# Patient Record
Sex: MALE | Race: WHITE | NOT HISPANIC OR LATINO | Employment: PART TIME | URBAN - METROPOLITAN AREA
[De-identification: names, ages, dates, MRNs, and addresses within clinical notes are randomized per-mention and may not be internally consistent; named-entity substitution may affect disease eponyms.]

---

## 2023-05-19 ENCOUNTER — HOSPITAL ENCOUNTER (OUTPATIENT)
Facility: HOSPITAL | Age: 38
Discharge: HOME | End: 2023-05-19
Attending: EMERGENCY MEDICINE | Admitting: EMERGENCY MEDICINE
Payer: MEDICAID

## 2023-05-19 VITALS
SYSTOLIC BLOOD PRESSURE: 142 MMHG | HEART RATE: 98 BPM | BODY MASS INDEX: 23.8 KG/M2 | RESPIRATION RATE: 17 BRPM | HEIGHT: 71 IN | WEIGHT: 170 LBS | TEMPERATURE: 97.8 F | DIASTOLIC BLOOD PRESSURE: 91 MMHG | OXYGEN SATURATION: 99 %

## 2023-05-19 DIAGNOSIS — L23.7 POISON OAK DERMATITIS: Primary | ICD-10-CM

## 2023-05-19 PROCEDURE — G0463 HOSPITAL OUTPT CLINIC VISIT: HCPCS | Performed by: EMERGENCY MEDICINE

## 2023-05-19 RX ORDER — PREDNISONE 20 MG/1
TABLET ORAL
Qty: 18 TABLET | Refills: 0 | Status: SHIPPED | OUTPATIENT
Start: 2023-05-19

## 2023-05-19 RX ORDER — CETIRIZINE HYDROCHLORIDE 10 MG/1
10 TABLET ORAL DAILY
Qty: 14 TABLET | Refills: 0 | Status: SHIPPED | OUTPATIENT
Start: 2023-05-19 | End: 2023-06-02

## 2023-05-19 NOTE — [facility] PROVIDER NOTE
Subjective   History of Present Illness  The patient is a 37-year-old male.  He presents with a 1 week history of pruritic rash on the left arm and a small amount on the right arm.  He had been working outside last weekend.  The area is red, there are some clear blisters overlying.  No secondary purulent discharge.  No fever no chills.  No shortness of breath        Review of Systems   Constitutional: No fevers, chills, sweats unless otherwise documented in HPI  Eyes: No recent visual problems, eye discharge, eye pain, redness unless otherwise documented in HPI  HEENT: No ear pain, nasal congestion, sore throat, voice changes unless otherwise documented in HPI  Respiratory: No shortness of breath, cough, pain on breathing, sputum production unless otherwise documented in HPI  Cardiovascular: No chest pain, palpitations, syncope, orthopnea unless otherwise documented in HPI  Gastrointestinal: No nausea, vomiting, diarrhea, constipation unless otherwise documented in HPI  Genitourinary: No hematuria, dysuria, incontinence unless otherwise documented in HPI  Endocrine: Negative for excessive thirst, excessive hunger, excessive urination, heat or cold intolerance unless otherwise documented in HPI  Musculoskeletal: No back pain, neck pain, joint pain, muscle pain, decreased range of motion unless otherwise documented in HPI  Integumentary: No rash, pruritus, abrasion, lesions unless otherwise documented in HPI  Neurologic: No weakness, numbness, frequent headaches, tremors unless otherwise documented in HPI  Psychiatric: No anxiety, depression, mood changes, hallucinations unless otherwise documented in HPI        Past Medical History:   Diagnosis Date   • ADD (attention deficit disorder)    • ADHD (attention deficit hyperactivity disorder)    • Anxiety    • Autism    • Depression    • Destructive behavior disorder    • Hyperlipidemia    • Impulsive    • Irregular heart beat    • Nasal congestion    • OCD (obsessive  compulsive disorder)    • Palpitations    • Physically aggressive behavior    • Sleep apnea     c-pap   • Tourette's        No Known Allergies    History reviewed. No pertinent surgical history.    Family History   Problem Relation Age of Onset   • Heart attack Father    • Hypertension Father    • Skin cancer Father        Social History     Socioeconomic History   • Marital status: Single   Tobacco Use   • Smoking status: Never   • Smokeless tobacco: Never   • Tobacco comments:     CAFFINE USE   Substance and Sexual Activity   • Alcohol use: No   • Drug use: No   • Sexual activity: Never           Objective   Physical Exam   General: Awake alert no acute distress  Skin: On the left forearm there is an area of maculopapular rash with some clear vesicles consistent with contact dermatitis.  No secondary cellulitis.  There is a small area on the right forearm    HEENT: Normocephalic/atraumatic nasopharynx clear pharynx clear moist    Lungs: Clear to auscultation bilaterally.  No wheezing no stridor    Procedures           ED Course                                           MDM     Differential Diagnosis: Contact dermatitis, allergic reaction     ED Course: Appropriate physical exam was performed.  Treatment course was discussed with the patient and his caregiver at bedside   results of patient's evaluation were discussed with the patient.  We also did discuss the treatment plan as well as appropriate return precautions.    Repeat Exam and discussion with patient, including plan of care,  prior to discharge: Patient is very stable.  Plan of care and return precautions have been given    My EKG Interpretation: N/A    My CT Interpretation: N/A    My Xray interpreation:   N/A    Decision rules/scores evaluated: N/A    Discussed with : N/A    Tests considered but not order: N/A    Shared decision making:   Shared decision making was undertaken with the patient.  The patient understands and concurs with current treatment  plan.    Code Status: Full Code    Social Determinants of Health that impacts treatment: Very strong social support system    Final diagnoses:   Poison oak dermatitis       ED Disposition  ED Disposition     ED Disposition   Discharge    Condition   Stable    Comment   --             Dalia Perez MD  [id number] [place] [id number] [phone]    In 1 week           Medication List      New Prescriptions    cetirizine 10 MG tablet  Commonly known as: zyrTEC  Take 1 tablet by mouth Daily for 14 days.     predniSONE 20 MG tablet  Commonly known as: DELTASONE  3 po daily x 3d, then 2 po daily x 3d, then 1 po daily x 3d.  Take each day's dosage together at one time with food           Where to Get Your Medications      These medications were sent to [facility]/pharmacy #[id number] - [place], [place] - [address] AT [facility] [phone] [facility] [phone]   [address], [facility] [address]    Phone: [phone]   · cetirizine 10 MG tablet  · predniSONE 20 MG tablet

## 2023-05-19 NOTE — DISCHARGE INSTRUCTIONS
I did send in 2 medications to your pharmacy.  We sent it to the [facility] pharmacy on [facility].    Return for worsening signs or symptoms.    Please read all of the instructions in this handout.  If you receive prescriptions please fill them and take them as directed.  Please call your primary care physician for follow-up appointment in the next 5 to 7 days.  If you do not have a physician you may call the [facility] referral line at [phone].    You may return to the emergency department at any time for any concerns such as worsening symptoms.  If you received a work or school note it will be printed at the back of this packet.